# Patient Record
Sex: MALE | Race: ASIAN | NOT HISPANIC OR LATINO
[De-identification: names, ages, dates, MRNs, and addresses within clinical notes are randomized per-mention and may not be internally consistent; named-entity substitution may affect disease eponyms.]

---

## 2017-02-24 ENCOUNTER — APPOINTMENT (OUTPATIENT)
Dept: HEART AND VASCULAR | Facility: CLINIC | Age: 62
End: 2017-02-24

## 2017-05-01 ENCOUNTER — APPOINTMENT (OUTPATIENT)
Dept: HEART AND VASCULAR | Facility: CLINIC | Age: 62
End: 2017-05-01

## 2017-05-01 DIAGNOSIS — K70.30 ALCOHOLIC CIRRHOSIS OF LIVER W/OUT ASCITES: ICD-10-CM

## 2017-05-01 DIAGNOSIS — I72.8 ANEURYSM OF OTHER SPECIFIED ARTERIES: ICD-10-CM

## 2017-05-01 DIAGNOSIS — Z86.79 PERSONAL HISTORY OF OTHER DISEASES OF THE CIRCULATORY SYSTEM: ICD-10-CM

## 2017-05-08 PROBLEM — I72.8 SPLENIC ARTERY ANEURYSM: Status: ACTIVE | Noted: 2017-05-08

## 2017-05-08 PROBLEM — K70.30 CIRRHOSIS, ALCOHOLIC: Status: ACTIVE | Noted: 2017-05-08

## 2017-05-08 PROBLEM — Z86.79 HISTORY OF PORTAL HYPERTENSION: Status: RESOLVED | Noted: 2017-05-08 | Resolved: 2017-05-08

## 2017-05-15 VITALS
HEART RATE: 78 BPM | OXYGEN SATURATION: 96 % | RESPIRATION RATE: 16 BRPM | TEMPERATURE: 98 F | SYSTOLIC BLOOD PRESSURE: 129 MMHG | DIASTOLIC BLOOD PRESSURE: 78 MMHG

## 2017-05-15 RX ORDER — INSULIN LISPRO 100/ML
5 VIAL (ML) SUBCUTANEOUS
Qty: 0 | Refills: 0 | COMMUNITY

## 2017-05-15 RX ORDER — CHLORHEXIDINE GLUCONATE 213 G/1000ML
1 SOLUTION TOPICAL ONCE
Qty: 0 | Refills: 0 | Status: DISCONTINUED | OUTPATIENT
Start: 2017-05-16 | End: 2017-05-17

## 2017-05-15 NOTE — H&P ADULT - HISTORY OF PRESENT ILLNESS
History obtained from Dr. Devi’s office note and patient (poor historian)  62 yo male with a PMhx of DMI, alcoholic cirrhosis, portal hypertension s/p TIPS procedure @ 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 Pt was on the liver transplant list @ [facility] where he was found to have a small hepatoma requiring microwave ablation @ [facility] at which time the large distal splenic aneurysm was again noted which is now filling from the gastric collaterals, based on angiographic report. Pt presented to Dr. Devi for evaluation of splenic aneurysm closure as they do not want to go ahead with the transplant at present.  Pt reports he feels well and denies abdominal pain, leg edema, jaundice, N/V, weight gain, bruising/bleeding. Pt now presents for embolization of splenic aneurysm. History obtained from Dr. Devi’s office note and patient (poor historian)  62 yo male with a PMhx of DMI, alcoholic cirrhosis, portal hypertension s/p TIPS procedure @ [facility] 2013 w/ known thrombocytopenia (baseline platelets 40) at which time he was noted to have a massively dilated splenic artery with at least 2 aneurysmal areas, requiring closure of the proximal splenic artery with coils and glue. Pt was on the liver transplant list @ [facility] where he was found to have a small hepatoma requiring microwave ablation @ [facility] at which time the large distal splenic aneurysm was again noted which is now filling from the gastric collaterals, based on angiographic report. Pt presented to Dr. Devi for evaluation of splenic aneurysm closure as they do not want to go ahead with the transplant at present.  Pt reports he feels well and denies abdominal pain, leg edema, jaundice, N/V, weight gain, bruising/bleeding. Pt now presents for embolization of splenic aneurysm.

## 2017-05-15 NOTE — H&P ADULT - ASSESSMENT
60 yo male with a PMhx of DMI, alcoholic cirrhosis, portal hypertension s/p TIPS procedure @ [facility] 2013 w/ known thrombocytopenia (baseline platelets 40) presents for splenic aneurysm embolization.     *Of note, patient to be transfused 2 units platelets due to labs revealing platelets 38 today 5/16/17.  Transfusion consent signed and in the chart.

## 2017-05-16 ENCOUNTER — INPATIENT (INPATIENT)
Facility: HOSPITAL | Age: 62
LOS: 0 days | Discharge: HOME | DRG: 271 | End: 2017-05-17
Attending: RADIOLOGY | Admitting: RADIOLOGY
Payer: COMMERCIAL

## 2017-05-16 DIAGNOSIS — Z95.828 PRESENCE OF OTHER VASCULAR IMPLANTS AND GRAFTS: Chronic | ICD-10-CM

## 2017-05-16 LAB
ALBUMIN SERPL ELPH-MCNC: 3.2 G/DL — LOW (ref 3.4–5)
ALP SERPL-CCNC: 74 U/L — SIGNIFICANT CHANGE UP (ref 40–120)
ALT FLD-CCNC: 20 U/L — SIGNIFICANT CHANGE UP (ref 12–42)
ANION GAP SERPL CALC-SCNC: 4 MMOL/L — LOW (ref 9–16)
APTT BLD: 35.2 SEC — SIGNIFICANT CHANGE UP (ref 27.5–37.4)
AST SERPL-CCNC: 18 U/L — SIGNIFICANT CHANGE UP (ref 15–37)
BASOPHILS NFR BLD AUTO: 0.6 % — SIGNIFICANT CHANGE UP (ref 0–2)
BILIRUB SERPL-MCNC: 4.5 MG/DL — HIGH (ref 0.2–1.2)
BUN SERPL-MCNC: 14 MG/DL — SIGNIFICANT CHANGE UP (ref 7–23)
CALCIUM SERPL-MCNC: 7.8 MG/DL — LOW (ref 8.5–10.5)
CHLORIDE SERPL-SCNC: 107 MMOL/L — SIGNIFICANT CHANGE UP (ref 96–108)
CO2 SERPL-SCNC: 28 MMOL/L — SIGNIFICANT CHANGE UP (ref 22–31)
CREAT SERPL-MCNC: 0.66 MG/DL — SIGNIFICANT CHANGE UP (ref 0.5–1.3)
EOSINOPHIL NFR BLD AUTO: 5.6 % — SIGNIFICANT CHANGE UP (ref 0–6)
GLUCOSE SERPL-MCNC: 183 MG/DL — HIGH (ref 70–99)
HCT VFR BLD CALC: 38 % — LOW (ref 39–50)
HGB BLD-MCNC: 14.2 G/DL — SIGNIFICANT CHANGE UP (ref 13–17)
INR BLD: 1.33 — HIGH (ref 0.88–1.16)
LYMPHOCYTES # BLD AUTO: 25.4 % — SIGNIFICANT CHANGE UP (ref 13–44)
MCHC RBC-ENTMCNC: 36.1 PG — HIGH (ref 27–34)
MCHC RBC-ENTMCNC: 37.4 G/DL — HIGH (ref 32–36)
MCV RBC AUTO: 96.7 FL — SIGNIFICANT CHANGE UP (ref 80–100)
MONOCYTES NFR BLD AUTO: 6.2 % — SIGNIFICANT CHANGE UP (ref 2–14)
NEUTROPHILS NFR BLD AUTO: 62.2 % — SIGNIFICANT CHANGE UP (ref 43–77)
PLATELET # BLD AUTO: 38 K/UL — LOW (ref 150–400)
POTASSIUM SERPL-MCNC: 3.8 MMOL/L — SIGNIFICANT CHANGE UP (ref 3.5–5.3)
POTASSIUM SERPL-SCNC: 3.8 MMOL/L — SIGNIFICANT CHANGE UP (ref 3.5–5.3)
PROT SERPL-MCNC: 6.7 G/DL — SIGNIFICANT CHANGE UP (ref 6.4–8.2)
PROTHROM AB SERPL-ACNC: 14.8 SEC — HIGH (ref 9.8–12.7)
RBC # BLD: 3.93 M/UL — LOW (ref 4.2–5.8)
RBC # FLD: 13.8 % — SIGNIFICANT CHANGE UP (ref 10.3–16.9)
SODIUM SERPL-SCNC: 139 MMOL/L — SIGNIFICANT CHANGE UP (ref 135–145)
WBC # BLD: 3.2 K/UL — LOW (ref 3.8–10.5)
WBC # FLD AUTO: 3.2 K/UL — LOW (ref 3.8–10.5)

## 2017-05-16 PROCEDURE — 37243 VASC EMBOLIZE/OCCLUDE ORGAN: CPT

## 2017-05-16 PROCEDURE — 75774 ARTERY X-RAY EACH VESSEL: CPT | Mod: 26,XS,59

## 2017-05-16 PROCEDURE — 75726 ARTERY X-RAYS ABDOMEN: CPT | Mod: 26,XS,59

## 2017-05-16 PROCEDURE — 93010 ELECTROCARDIOGRAM REPORT: CPT

## 2017-05-16 PROCEDURE — 36246 INS CATH ABD/L-EXT ART 2ND: CPT | Mod: LT

## 2017-05-16 RX ORDER — INSULIN GLARGINE 100 [IU]/ML
15 INJECTION, SOLUTION SUBCUTANEOUS AT BEDTIME
Qty: 0 | Refills: 0 | Status: DISCONTINUED | OUTPATIENT
Start: 2017-05-16 | End: 2017-05-17

## 2017-05-16 RX ORDER — INSULIN GLARGINE 100 [IU]/ML
15 INJECTION, SOLUTION SUBCUTANEOUS EVERY MORNING
Qty: 0 | Refills: 0 | Status: DISCONTINUED | OUTPATIENT
Start: 2017-05-17 | End: 2017-05-17

## 2017-05-16 RX ORDER — INSULIN GLARGINE 100 [IU]/ML
30 INJECTION, SOLUTION SUBCUTANEOUS
Qty: 0 | Refills: 0 | COMMUNITY

## 2017-05-16 RX ORDER — INSULIN ASPART 100 [IU]/ML
5 INJECTION, SOLUTION SUBCUTANEOUS
Qty: 0 | Refills: 0 | COMMUNITY

## 2017-05-16 RX ORDER — ONDANSETRON 8 MG/1
4 TABLET, FILM COATED ORAL EVERY 4 HOURS
Qty: 0 | Refills: 0 | Status: DISCONTINUED | OUTPATIENT
Start: 2017-05-16 | End: 2017-05-17

## 2017-05-16 RX ORDER — DEXTROSE 50 % IN WATER 50 %
25 SYRINGE (ML) INTRAVENOUS ONCE
Qty: 0 | Refills: 0 | Status: DISCONTINUED | OUTPATIENT
Start: 2017-05-16 | End: 2017-05-17

## 2017-05-16 RX ORDER — DEXTROSE 50 % IN WATER 50 %
1 SYRINGE (ML) INTRAVENOUS ONCE
Qty: 0 | Refills: 0 | Status: DISCONTINUED | OUTPATIENT
Start: 2017-05-16 | End: 2017-05-17

## 2017-05-16 RX ORDER — NADOLOL 80 MG/1
0 TABLET ORAL
Qty: 0 | Refills: 0 | COMMUNITY

## 2017-05-16 RX ORDER — DEXTROSE 50 % IN WATER 50 %
12.5 SYRINGE (ML) INTRAVENOUS ONCE
Qty: 0 | Refills: 0 | Status: DISCONTINUED | OUTPATIENT
Start: 2017-05-16 | End: 2017-05-17

## 2017-05-16 RX ORDER — DOCUSATE SODIUM 100 MG
0 CAPSULE ORAL
Qty: 0 | Refills: 0 | COMMUNITY

## 2017-05-16 RX ORDER — GLUCAGON INJECTION, SOLUTION 0.5 MG/.1ML
1 INJECTION, SOLUTION SUBCUTANEOUS ONCE
Qty: 0 | Refills: 0 | Status: DISCONTINUED | OUTPATIENT
Start: 2017-05-16 | End: 2017-05-17

## 2017-05-16 RX ORDER — SODIUM CHLORIDE 9 MG/ML
1000 INJECTION, SOLUTION INTRAVENOUS
Qty: 0 | Refills: 0 | Status: DISCONTINUED | OUTPATIENT
Start: 2017-05-16 | End: 2017-05-17

## 2017-05-16 RX ORDER — INSULIN LISPRO 100/ML
VIAL (ML) SUBCUTANEOUS
Qty: 0 | Refills: 0 | Status: DISCONTINUED | OUTPATIENT
Start: 2017-05-16 | End: 2017-05-17

## 2017-05-16 RX ORDER — MORPHINE SULFATE 50 MG/1
4 CAPSULE, EXTENDED RELEASE ORAL EVERY 8 HOURS
Qty: 0 | Refills: 0 | Status: DISCONTINUED | OUTPATIENT
Start: 2017-05-16 | End: 2017-05-17

## 2017-05-16 RX ORDER — CEFAZOLIN SODIUM 1 G
1000 VIAL (EA) INJECTION EVERY 8 HOURS
Qty: 0 | Refills: 0 | Status: DISCONTINUED | OUTPATIENT
Start: 2017-05-16 | End: 2017-05-17

## 2017-05-16 RX ORDER — INSULIN LISPRO 100/ML
0 VIAL (ML) SUBCUTANEOUS
Qty: 0 | Refills: 0 | COMMUNITY

## 2017-05-16 RX ORDER — SODIUM CHLORIDE 9 MG/ML
1000 INJECTION INTRAMUSCULAR; INTRAVENOUS; SUBCUTANEOUS
Qty: 0 | Refills: 0 | Status: DISCONTINUED | OUTPATIENT
Start: 2017-05-16 | End: 2017-05-17

## 2017-05-16 RX ORDER — INSULIN LISPRO 100/ML
15 VIAL (ML) SUBCUTANEOUS
Qty: 0 | Refills: 0 | COMMUNITY

## 2017-05-16 RX ADMIN — INSULIN GLARGINE 15 UNIT(S): 100 INJECTION, SOLUTION SUBCUTANEOUS at 22:35

## 2017-05-16 RX ADMIN — Medication 2: at 22:33

## 2017-05-16 RX ADMIN — Medication 100 MILLIGRAM(S): at 22:33

## 2017-05-16 NOTE — BRIEF OPERATIVE NOTE - COMMENTS
5F R CFA sheath out and hemostasis obtained using manual compression. Bedrest 6h post sheath pull. Ancef 1g IV q8h x 3 doses.

## 2017-05-16 NOTE — BRIEF OPERATIVE NOTE - OPERATION/FINDINGS
visceral angiogram demonstrates splenic artery aneurysm supplied by branches of right and left gastric artery, branches of left hepatic artery, embolization performed using 1cc of nBCA adhesive, visceral angiogram demonstrates splenic artery aneurysm supplied by branches of right and left gastric artery, GDA, branches of left hepatic artery, embolization performed on gastroepiploic artery using 1cc of nBCA adhesive,

## 2017-05-17 ENCOUNTER — TRANSCRIPTION ENCOUNTER (OUTPATIENT)
Age: 62
End: 2017-05-17

## 2017-05-17 VITALS — TEMPERATURE: 99 F

## 2017-05-17 LAB
ALBUMIN SERPL ELPH-MCNC: 2.9 G/DL — LOW (ref 3.4–5)
ALP SERPL-CCNC: 69 U/L — SIGNIFICANT CHANGE UP (ref 40–120)
ALT FLD-CCNC: 20 U/L — SIGNIFICANT CHANGE UP (ref 12–42)
ANION GAP SERPL CALC-SCNC: 8 MMOL/L — LOW (ref 9–16)
APPEARANCE UR: CLEAR — SIGNIFICANT CHANGE UP
AST SERPL-CCNC: 28 U/L — SIGNIFICANT CHANGE UP (ref 15–37)
BILIRUB SERPL-MCNC: 4.2 MG/DL — HIGH (ref 0.2–1.2)
BILIRUB UR-MCNC: NEGATIVE — SIGNIFICANT CHANGE UP
BUN SERPL-MCNC: 18 MG/DL — SIGNIFICANT CHANGE UP (ref 7–23)
CALCIUM SERPL-MCNC: 8.2 MG/DL — LOW (ref 8.5–10.5)
CHLORIDE SERPL-SCNC: 103 MMOL/L — SIGNIFICANT CHANGE UP (ref 96–108)
CO2 SERPL-SCNC: 25 MMOL/L — SIGNIFICANT CHANGE UP (ref 22–31)
COLOR SPEC: YELLOW — SIGNIFICANT CHANGE UP
CREAT SERPL-MCNC: 0.72 MG/DL — SIGNIFICANT CHANGE UP (ref 0.5–1.3)
DIFF PNL FLD: (no result)
GLUCOSE SERPL-MCNC: 290 MG/DL — HIGH (ref 70–99)
GLUCOSE UR QL: NEGATIVE — SIGNIFICANT CHANGE UP
HBA1C BLD-MCNC: 6.7 % — HIGH (ref 4.8–5.6)
HCT VFR BLD CALC: 36.8 % — LOW (ref 39–50)
HGB BLD-MCNC: 13.5 G/DL — SIGNIFICANT CHANGE UP (ref 13–17)
KETONES UR-MCNC: NEGATIVE — SIGNIFICANT CHANGE UP
LEUKOCYTE ESTERASE UR-ACNC: NEGATIVE — SIGNIFICANT CHANGE UP
MCHC RBC-ENTMCNC: 35.3 PG — HIGH (ref 27–34)
MCHC RBC-ENTMCNC: 36.7 G/DL — HIGH (ref 32–36)
MCV RBC AUTO: 96.3 FL — SIGNIFICANT CHANGE UP (ref 80–100)
NITRITE UR-MCNC: NEGATIVE — SIGNIFICANT CHANGE UP
PH UR: 7.5 — SIGNIFICANT CHANGE UP (ref 5–8)
PLATELET # BLD AUTO: 62 K/UL — LOW (ref 150–400)
POTASSIUM SERPL-MCNC: 4.7 MMOL/L — SIGNIFICANT CHANGE UP (ref 3.5–5.3)
POTASSIUM SERPL-SCNC: 4.7 MMOL/L — SIGNIFICANT CHANGE UP (ref 3.5–5.3)
PROT SERPL-MCNC: 6.6 G/DL — SIGNIFICANT CHANGE UP (ref 6.4–8.2)
PROT UR-MCNC: 30 MG/DL
RBC # BLD: 3.82 M/UL — LOW (ref 4.2–5.8)
RBC # FLD: 13.4 % — SIGNIFICANT CHANGE UP (ref 10.3–16.9)
SODIUM SERPL-SCNC: 136 MMOL/L — SIGNIFICANT CHANGE UP (ref 135–145)
SP GR SPEC: <=1.005 — SIGNIFICANT CHANGE UP (ref 1–1.03)
UROBILINOGEN FLD QL: 1 E.U./DL — SIGNIFICANT CHANGE UP
WBC # BLD: 4.9 K/UL — SIGNIFICANT CHANGE UP (ref 3.8–10.5)
WBC # FLD AUTO: 4.9 K/UL — SIGNIFICANT CHANGE UP (ref 3.8–10.5)

## 2017-05-17 PROCEDURE — 86900 BLOOD TYPING SEROLOGIC ABO: CPT

## 2017-05-17 PROCEDURE — 36430 TRANSFUSION BLD/BLD COMPNT: CPT

## 2017-05-17 PROCEDURE — C1887: CPT

## 2017-05-17 PROCEDURE — 36415 COLL VENOUS BLD VENIPUNCTURE: CPT

## 2017-05-17 PROCEDURE — 87086 URINE CULTURE/COLONY COUNT: CPT

## 2017-05-17 PROCEDURE — 86850 RBC ANTIBODY SCREEN: CPT

## 2017-05-17 PROCEDURE — 83036 HEMOGLOBIN GLYCOSYLATED A1C: CPT

## 2017-05-17 PROCEDURE — 81001 URINALYSIS AUTO W/SCOPE: CPT

## 2017-05-17 PROCEDURE — 85610 PROTHROMBIN TIME: CPT

## 2017-05-17 PROCEDURE — 93005 ELECTROCARDIOGRAM TRACING: CPT

## 2017-05-17 PROCEDURE — 80053 COMPREHEN METABOLIC PANEL: CPT

## 2017-05-17 PROCEDURE — 86901 BLOOD TYPING SEROLOGIC RH(D): CPT

## 2017-05-17 PROCEDURE — C1889: CPT

## 2017-05-17 PROCEDURE — C1769: CPT

## 2017-05-17 PROCEDURE — P9035: CPT

## 2017-05-17 PROCEDURE — 85730 THROMBOPLASTIN TIME PARTIAL: CPT

## 2017-05-17 PROCEDURE — C1894: CPT

## 2017-05-17 PROCEDURE — 85027 COMPLETE CBC AUTOMATED: CPT

## 2017-05-17 PROCEDURE — 85025 COMPLETE CBC W/AUTO DIFF WBC: CPT

## 2017-05-17 RX ORDER — CEPHALEXIN 500 MG
1 CAPSULE ORAL
Qty: 28 | Refills: 0 | OUTPATIENT
Start: 2017-05-17 | End: 2017-05-24

## 2017-05-17 RX ORDER — INSULIN GLARGINE 100 [IU]/ML
15 INJECTION, SOLUTION SUBCUTANEOUS ONCE
Qty: 0 | Refills: 0 | Status: COMPLETED | OUTPATIENT
Start: 2017-05-17 | End: 2017-05-17

## 2017-05-17 RX ADMIN — Medication 4: at 06:01

## 2017-05-17 RX ADMIN — Medication 100 MILLIGRAM(S): at 06:00

## 2017-05-17 RX ADMIN — Medication 3: at 11:48

## 2017-05-17 RX ADMIN — INSULIN GLARGINE 15 UNIT(S): 100 INJECTION, SOLUTION SUBCUTANEOUS at 11:01

## 2017-05-17 RX ADMIN — INSULIN GLARGINE 15 UNIT(S): 100 INJECTION, SOLUTION SUBCUTANEOUS at 09:00

## 2017-05-17 NOTE — DISCHARGE NOTE ADULT - HOSPITAL COURSE
62 y/o M w/ PMHX DM, Alcoholic Cirrhosis, Portal Hypertension s/p TIPS procedure, known Thrombocytopenia (baseline platelet count 40s), w/ Splenic Artery Aneurysm, referred for embolization of splenic artery aneurysm.      Pt s/p visceral angiogram on 5/16/17 showing splenic artery aneurysm supplied by branches of right and left gastric artery, GDA, branches of left hepatic artery, embolization performed on gastroepiploic artery.   Pt s/p splenic artery aneurysm embolization as above.     This AM VSS, Pt Asymptomatic, R Groin Stable, R DP 2+, R PT 2+, Labs reviewed    Pt reported hematuria overnight which is resolved this AM. Hematuria is possibly 2nd to Rifaximin medication which patient takes to prevent hepatic encephalopathy. H/H stable 13.5/36. UA showed moderate blood overnight and urine clear this AM. Outpatient follow-up with PMD as discussed with Dr. Devi.     H/o Thrombocytopenia with baseline platelet count 40s per H and P. Platelet count 38 yesterday and 62 this AM. Outpatient follow-up with PMD as discussed with Dr. Devi.    FS was 330 this AM and now 290s after patient received total of Lantus 30 units this AM (home dose as confirmed with patient and pharmacy). LAURA was ordered as well. Outpatient follow-up with PMD as discussed with Dr. Devi.     Pt stable for D/C home as per Dr. Devi. See Primary Doctor Dr. Talia Brito tomorrow.  Dr. Devi’s office will contact patient in reference to follow-up. See Dr. Lokesh Ramírez (Hepatologist/GI MD)  in 1 week. 62 y/o M w/ PMHX DM, Alcoholic Cirrhosis, Portal Hypertension s/p TIPS procedure, known Thrombocytopenia (baseline platelet count 40s), w/ Splenic Artery Aneurysm, referred for embolization of splenic artery aneurysm.      Pt s/p visceral angiogram on 5/16/17 showing splenic artery aneurysm supplied by branches of right and left gastric artery, GDA, branches of left hepatic artery, embolization performed on gastroepiploic artery.   Pt s/p splenic artery aneurysm embolization as above.     This AM VSS, Pt Asymptomatic, R Groin Stable, R DP 2+, R PT 2+, Labs reviewed    Pt reported hematuria overnight which is resolved this AM. Hematuria is possibly 2nd to Rifaximin medication which patient takes to prevent hepatic encephalopathy. H/H stable 13.5/36. UA showed moderate blood overnight and urine clear this AM. Outpatient follow-up with PMD as discussed with Dr. Devi.     H/o Thrombocytopenia with baseline platelet count 40s per H and P. Platelet count 38 yesterday and 62 this AM. Outpatient follow-up with PMD as discussed with Dr. Devi.    FS was 330 this AM and now 290s after patient received total of Lantus 30 units this AM (home dose as confirmed with patient and pharmacy). LAURA was ordered as well. Outpatient follow-up with PMD as discussed with Dr. Devi.     Pt stable for D/C home as per Dr. Devi. See Primary Doctor Dr. Talia Brito tomorrow.  Dr. Devi’s office will contact patient in reference to follow-up. See Dr. Lokesh Ramírez (Hepatologist/GI MD)  in 1 week.      Pt seen and examined bedside.  Pt reports one episode of hematuria overnight that has self-resolved.  Pt denies fever/chills, dysuria, abdominal pain, diarrhea, and cough  Pt denies melena, BRBPR, hematemesis.   	  V/S 	BP: 110s/60s 	HR: 80s    RR: 16    T: 98	O2: 98% RA   	  GEN: NAD  PULM:  CTA B/L  CARD: No JVD B/L, RRR, S1 and S2   ABD: +BS, NT, soft/ND	  EXT: No Edema B/L LE, R DP 2+, R PT 2+  R GROIN: soft, no hematoma, no bleeding, no femoral bruit  NEURO: A+Ox3, no focal deficit                          13.5   4.9   )-----------( 62       ( 17 May 2017 07:43 )             36.8     05-17    136  |  103  |  18  ----------------------------<  290<H>  4.7   |  25  |  0.72    Ca    8.2<L>      17 May 2017 07:43    TPro  6.6  /  Alb  2.9<L>  /  TBili  4.2<H>  /  DBili  x   /  AST  28  /  ALT  20  /  AlkPhos  69  05-17 62 y/o M w/ PMHX DM, Alcoholic Cirrhosis, Portal Hypertension s/p TIPS procedure, known Thrombocytopenia (baseline platelet count 40s), w/ Splenic Artery Aneurysm, referred for embolization of splenic artery aneurysm.      Pt s/p visceral angiogram on 5/16/17 showing splenic artery aneurysm supplied by branches of right and left gastric artery, GDA, branches of left hepatic artery, embolization performed on gastroepiploic artery.   Pt s/p splenic artery aneurysm embolization as above.     This AM VSS, Pt Asymptomatic, R Groin Stable, R DP 2+, R PT 2+, Labs reviewed    Pt reported hematuria overnight which is resolved this AM. Hematuria is possibly 2nd to Rifaximin medication which patient takes to prevent hepatic encephalopathy. H/H stable 13.5/36. UA showed moderate blood overnight and urine clear this AM. Outpatient follow-up with PMD as discussed with Dr. Devi.     H/o Thrombocytopenia with baseline platelet count 40s per H and P. Platelet count 38 yesterday and 62 this AM. Outpatient follow-up with PMD as discussed with Dr. Devi.    FS was 330 this AM and now 290s after patient received total of Lantus 30 units this AM (home dose as confirmed with patient and pharmacy). LAURA was ordered as well. Outpatient follow-up with PMD as discussed with Dr. Devi.     Pt stable for D/C home as per Dr. Devi. See Primary Doctor Dr. Talia Brito tomorrow.  Dr. Devi’s office will contact patient in reference to follow-up. See Dr. Lokesh Ramírez (Hepatologist/GI MD)  in 1 week.      Pt seen and examined bedside.  Pt reports one episode of hematuria overnight that has self-resolved.  Pt denies fever/chills, dysuria, abdominal pain, diarrhea, and cough  Pt denies melena, BRBPR, hematemesis.   	  V/S 	BP: 130s/60s 	HR: 60s-70ss    RR: 16    T: 98	O2: 95% RA   	  GEN: NAD  PULM:  CTA B/L  CARD: No JVD B/L, RRR, S1 and S2   ABD: +BS, NT, soft/ND	  EXT: No Edema B/L LE, R DP 2+, R PT 2+  R GROIN: soft, no hematoma, no bleeding, no femoral bruit  NEURO: A+Ox3, no focal deficit                          13.5   4.9   )-----------( 62       ( 17 May 2017 07:43 )             36.8     05-17    136  |  103  |  18  ----------------------------<  290<H>  4.7   |  25  |  0.72    Ca    8.2<L>      17 May 2017 07:43    TPro  6.6  /  Alb  2.9<L>  /  TBili  4.2<H>  /  DBili  x   /  AST  28  /  ALT  20  /  AlkPhos  69  05-17

## 2017-05-17 NOTE — DISCHARGE NOTE ADULT - CARE PLAN
Principal Discharge DX:	Splenic artery aneurysm  Goal:	MD follow-up  Instructions for follow-up, activity and diet:	Prescribed Keflex as antibiotic for infection prevention. See Primary Doctor Dr. Talia Brito tomorrow.  Dr. Devi’s office will contact you in reference to follow-up. See Dr. Lokesh Ramírez in 1 week.     Monitor right groin/leg for swelling, bleeding, pain or skin discoloration if any of these findings are noted go to nearest ER and seek medical attention and call [phone] if any questions  Secondary Diagnosis:	Thrombocytopenia  Instructions for follow-up, activity and diet:	MD follow-up recommended for low platelet count. See Primary Doctor Dr. Talia Brito tomorrow.  See Dr. Lokesh Ramírez in 1 week. If blood noted in urine, stool, cough, notify MD and seek medical attention  Secondary Diagnosis:	DM (diabetes mellitus)  Instructions for follow-up, activity and diet:	Continue Basaglar (Lantus) home insulin regimen and Humalog home insulin regimen (sliding scale). Check fingerstick three times daily before meals and at bedtime. If fingerstick greater than 300 call MD for further instructions. Avoid concentrated sweets. Follow diabetic diet. . See Primary Doctor Dr. Talia Brito tomorrow  Secondary Diagnosis:	Hematuria  Instructions for follow-up, activity and diet:	The blood noted in your urine overnight can be the result of the Xifaxan (Rifaximin) medication you are taking. See Primary Doctor Dr. Talia Brito tomorrow and See Dr. Lokesh Ramírez in 1 week for further evaluation. Your Primary Doctor can call [phone] tomorrow to follow-up the results of your urine culture sent at [facility] overnight  Secondary Diagnosis:	Portal hypertension  Instructions for follow-up, activity and diet:	See Primary Doctor Dr. Talia Brito tomorrow and See Dr. Lokesh Ramírez in 1 week for further evaluation. Continue current medical regimen until MD follow-up Principal Discharge DX:	Splenic artery aneurysm  Goal:	MD follow-up  Instructions for follow-up, activity and diet:	Prescribed Keflex as antibiotic for infection prevention. See Primary Doctor Dr. Talia Brito tomorrow.  Dr. Devi’s office will contact you in reference to follow-up. See Dr. Lokesh Ramírez in 1 week.     Monitor right groin/leg for swelling, bleeding, pain or skin discoloration if any of these findings are noted go to nearest ER and seek medical attention and call [phone] if any questions  Secondary Diagnosis:	Thrombocytopenia  Instructions for follow-up, activity and diet:	MD follow-up recommended for low platelet count. See Primary Doctor Dr. Talia Brito tomorrow.  See Dr. Lokesh Ramírez in 1 week. If blood noted in urine, stool, cough, notify MD and seek medical attention  Secondary Diagnosis:	DM (diabetes mellitus)  Instructions for follow-up, activity and diet:	Continue Basaglar (Lantus) home insulin regimen and Humalog home insulin regimen (sliding scale). Check fingerstick three times daily before meals and at bedtime. If fingerstick greater than 300 call MD for further instructions. Avoid concentrated sweets. Follow diabetic diet. . See Primary Doctor Dr. Talia Brito tomorrow  Secondary Diagnosis:	Hematuria  Instructions for follow-up, activity and diet:	The blood noted in your urine overnight can be the result of the Xifaxan (Rifaximin) medication you are taking. See Primary Doctor Dr. Talia Brito tomorrow and See Dr. Lokesh Ramírez in 1 week for further evaluation. Your Primary Doctor can call [phone] tomorrow to follow-up the results of your urine culture sent at [facility] overnight  Secondary Diagnosis:	Portal hypertension  Instructions for follow-up, activity and diet:	See Primary Doctor Dr. Talia Brito tomorrow and See Dr. Lokesh Ramírez in 1 week for further evaluation. Continue current medical regimen until MD follow-up Principal Discharge DX:	Splenic artery aneurysm  Goal:	MD follow-up  Instructions for follow-up, activity and diet:	Prescribed Keflex as antibiotic for infection prevention. See Primary Doctor Dr. Talia Brito tomorrow.  Dr. Devi’s office will contact you in reference to follow-up. See Dr. Lokesh Ramírez in 1 week.     Monitor right groin/leg for swelling, bleeding, pain or skin discoloration if any of these findings are noted go to nearest ER and seek medical attention and call [phone] if any questions  Secondary Diagnosis:	Thrombocytopenia  Instructions for follow-up, activity and diet:	MD follow-up recommended for low platelet count. See Primary Doctor Dr. Talia Brito tomorrow.  See Dr. Lokesh Ramírez in 1 week. If blood noted in urine, stool, cough, notify MD and seek medical attention  Secondary Diagnosis:	DM (diabetes mellitus)  Instructions for follow-up, activity and diet:	Continue Basaglar (Lantus) home insulin regimen and Humalog home insulin regimen (sliding scale). Check fingerstick three times daily before meals and at bedtime. If fingerstick greater than 300 call MD for further instructions. Avoid concentrated sweets. Follow diabetic diet. . See Primary Doctor Dr. Tlaia Brito tomorrow  Secondary Diagnosis:	Hematuria  Instructions for follow-up, activity and diet:	The blood noted in your urine overnight can be the result of the Xifaxan (Rifaximin) medication you are taking. See Primary Doctor Dr. Talia Brito tomorrow and See Dr. Lokesh Ramírez in 1 week for further evaluation. Your Primary Doctor can call [phone] tomorrow to follow-up the results of your urine culture sent at [facility] overnight  Secondary Diagnosis:	Portal hypertension  Instructions for follow-up, activity and diet:	See Primary Doctor Dr. Talia Brito tomorrow and See Dr. Lokesh Ramírez in 1 week for further evaluation. Continue current medical regimen until MD follow-up Principal Discharge DX:	Splenic artery aneurysm  Goal:	MD follow-up  Instructions for follow-up, activity and diet:	Prescribed Keflex as antibiotic for infection prevention. See Primary Doctor Dr. Talia Brito tomorrow.  Dr. Devi’s office will contact you in reference to follow-up. See Dr. Lokesh Ramírez in 1 week.     Monitor right groin/leg for swelling, bleeding, pain or skin discoloration if any of these findings are noted go to nearest ER and seek medical attention and call [phone] if any questions  Secondary Diagnosis:	Thrombocytopenia  Instructions for follow-up, activity and diet:	MD follow-up recommended for low platelet count. See Primary Doctor Dr. Talia Brito tomorrow.  See Dr. Lokesh Ramírez in 1 week. If blood noted in urine, stool, cough, notify MD and seek medical attention  Secondary Diagnosis:	DM (diabetes mellitus)  Instructions for follow-up, activity and diet:	Continue Basaglar (Lantus) home insulin regimen and Humalog home insulin regimen (sliding scale). Check fingerstick three times daily before meals and at bedtime. If fingerstick greater than 300 call MD for further instructions. Avoid concentrated sweets. Follow diabetic diet. . See Primary Doctor Dr. Talia Brito tomorrow  Secondary Diagnosis:	Hematuria  Instructions for follow-up, activity and diet:	The blood noted in your urine overnight can be the result of the Xifaxan (Rifaximin) medication you are taking. See Primary Doctor Dr. Talia Brito tomorrow and See Dr. Lokesh Ramírez in 1 week for further evaluation. Your Primary Doctor can call [phone] tomorrow to follow-up the results of your urine culture sent at [facility] overnight  Secondary Diagnosis:	Portal hypertension  Instructions for follow-up, activity and diet:	See Primary Doctor Dr. Talia Brito tomorrow and See Dr. Lokesh Ramírez in 1 week for further evaluation. Continue current medical regimen until MD follow-up

## 2017-05-17 NOTE — DISCHARGE NOTE ADULT - CARE PROVIDERS DIRECT ADDRESSES
[email or web address],DirectAddress_Unknown

## 2017-05-17 NOTE — DISCHARGE NOTE ADULT - PLAN OF CARE
MD follow-up Prescribed Keflex as antibiotic for infection prevention. See Primary Doctor Dr. Talia Brito tomorrow.  Dr. Devi’s office will contact you in reference to follow-up. See Dr. Lokesh Ramírez in 1 week.     Monitor right groin/leg for swelling, bleeding, pain or skin discoloration if any of these findings are noted go to nearest ER and seek medical attention and call [phone] if any questions MD follow-up recommended for low platelet count. See Primary Doctor Dr. Talia Brito tomorrow.  See Dr. Lokesh Ramírez in 1 week. If blood noted in urine, stool, cough, notify MD and seek medical attention Continue Basaglar (Lantus) home insulin regimen and Humalog home insulin regimen (sliding scale). Check fingerstick three times daily before meals and at bedtime. If fingerstick greater than 300 call MD for further instructions. Avoid concentrated sweets. Follow diabetic diet. . See Primary Doctor Dr. Talia Brito tomorrow The blood noted in your urine overnight can be the result of the Xifaxan (Rifaximin) medication you are taking. See Primary Doctor Dr. Talia Brito tomorrow and See Dr. Lokesh Ramírez in 1 week for further evaluation. Your Primary Doctor can call [phone] tomorrow to follow-up the results of your urine culture sent at [facility] overnight See Primary Doctor Dr. Talia Brito tomorrow and See Dr. Lokesh Ramírez in 1 week for further evaluation. Continue current medical regimen until MD follow-up

## 2017-05-17 NOTE — DISCHARGE NOTE ADULT - PATIENT PORTAL LINK FT
“You can access the FollowHealth Patient Portal, offered by [facility], by registering with the following website: [email or web address]”

## 2017-05-17 NOTE — DISCHARGE NOTE ADULT - MEDICATION SUMMARY - MEDICATIONS TO TAKE
I will START or STAY ON the medications listed below when I get home from the hospital:    HumaLOG KwikPen 100 units/mL subcutaneous solution  -- unit(s) subcutaneous 3 times a day, As Needed  -- Indication: For Diabetes    Basaglar KwikPen 100 units/mL subcutaneous solution  -- 30 unit(s) subcutaneous 2 times a day  -- Indication: For Diabetes    nadolol 20 mg oral tablet  --  by mouth once a day  -- Indication: For Portal Hypertension    Keflex 500 mg oral capsule  -- 1 cap(s) by mouth 4 times a day  -- Finish all this medication unless otherwise directed by prescriber.    -- Indication: For Infection Prevention    Doc-Q-Lace 100 mg oral capsule  --  by mouth 3 times a day  -- Indication: For As Needed for Constipation    Xifaxan 550 mg oral tablet  -- 1 tab(s) by mouth 2 times a day  -- Indication: For to Prevent Hepatic Encephalopathy

## 2017-05-17 NOTE — DISCHARGE NOTE ADULT - PROVIDER TOKENS
TOKEN:'[id number]:[id number]',FREE:[LAST:[Desiree],FIRST:[Lokesh],PHONE:[phone]:[(   )    -],ADDRESS:[[address] [place] [address]]],FREE:[LAST:[Daryl,],FIRST:[Talia],PHONE:[phone]:[(   )    -],ADDRESS:[[address] [place] [address]]]

## 2017-05-17 NOTE — DISCHARGE NOTE ADULT - CARE PROVIDER_API CALL
Margarito Devi), Diagnostic Radiology  [address] [address]  Phone: [phone]  Fax: [phone]    Lokesh Ramírez  [address] [place] [address]  Phone: [phone]  Fax: (   )    -    Brito Yeahseon  [address] [place] [address]  Phone: [phone]  Fax: (   )    -

## 2017-05-18 LAB
CULTURE RESULTS: NO GROWTH — SIGNIFICANT CHANGE UP
SPECIMEN SOURCE: SIGNIFICANT CHANGE UP

## 2017-05-20 DIAGNOSIS — R31.9 HEMATURIA, UNSPECIFIED: ICD-10-CM

## 2017-05-20 DIAGNOSIS — F10.10 ALCOHOL ABUSE, UNCOMPLICATED: ICD-10-CM

## 2017-05-20 DIAGNOSIS — I72.8 ANEURYSM OF OTHER SPECIFIED ARTERIES: ICD-10-CM

## 2017-05-20 DIAGNOSIS — Z79.4 LONG TERM (CURRENT) USE OF INSULIN: ICD-10-CM

## 2017-05-20 DIAGNOSIS — Z98.890 OTHER SPECIFIED POSTPROCEDURAL STATES: ICD-10-CM

## 2017-05-20 DIAGNOSIS — K70.30 ALCOHOLIC CIRRHOSIS OF LIVER WITHOUT ASCITES: ICD-10-CM

## 2017-05-20 DIAGNOSIS — T36.6X5A ADVERSE EFFECT OF RIFAMPICINS, INITIAL ENCOUNTER: ICD-10-CM

## 2017-05-20 DIAGNOSIS — K76.6 PORTAL HYPERTENSION: ICD-10-CM

## 2017-05-20 DIAGNOSIS — E11.9 TYPE 2 DIABETES MELLITUS WITHOUT COMPLICATIONS: ICD-10-CM

## 2017-05-20 DIAGNOSIS — D69.6 THROMBOCYTOPENIA, UNSPECIFIED: ICD-10-CM

## 2019-06-17 ENCOUNTER — APPOINTMENT (OUTPATIENT)
Dept: UROLOGY | Facility: CLINIC | Age: 64
End: 2019-06-17
Payer: MEDICAID

## 2019-06-17 VITALS
WEIGHT: 180 LBS | TEMPERATURE: 98.3 F | HEIGHT: 67 IN | BODY MASS INDEX: 28.25 KG/M2 | DIASTOLIC BLOOD PRESSURE: 79 MMHG | SYSTOLIC BLOOD PRESSURE: 135 MMHG | HEART RATE: 86 BPM

## 2019-06-17 DIAGNOSIS — C22.8 MALIGNANT NEOPLASM OF LIVER, PRIMARY, UNSPECIFIED AS TO TYPE: ICD-10-CM

## 2019-06-17 DIAGNOSIS — E11.9 TYPE 2 DIABETES MELLITUS W/OUT COMPLICATIONS: ICD-10-CM

## 2019-06-17 DIAGNOSIS — K62.4 STENOSIS OF ANUS AND RECTUM: ICD-10-CM

## 2019-06-17 DIAGNOSIS — N40.1 BENIGN PROSTATIC HYPERPLASIA WITH LOWER URINARY TRACT SYMPMS: ICD-10-CM

## 2019-06-17 DIAGNOSIS — E11.49 TYPE 2 DIABETES MELLITUS WITH OTHER DIABETIC NEUROLOGICAL COMPLICATION: ICD-10-CM

## 2019-06-17 DIAGNOSIS — Z87.891 PERSONAL HISTORY OF NICOTINE DEPENDENCE: ICD-10-CM

## 2019-06-17 DIAGNOSIS — R35.1 NOCTURIA: ICD-10-CM

## 2019-06-17 PROCEDURE — 99204 OFFICE O/P NEW MOD 45 MIN: CPT

## 2019-06-17 RX ORDER — OXYBUTYNIN CHLORIDE 5 MG/1
5 TABLET, EXTENDED RELEASE ORAL
Qty: 30 | Refills: 3 | Status: ACTIVE | OUTPATIENT
Start: 2019-06-17

## 2019-06-17 NOTE — REVIEW OF SYSTEMS
[Wake up at night to urinate  How many times?  ___] : wakes up to urinate [unfilled] times during the night [Strong urge to urinate] : strong urge to urinate [Bladder pressure] : experiences bladder pressure [Negative] : Heme/Lymph

## 2019-06-17 NOTE — LETTER BODY
[FreeTextEntry1] : Talia Brito MD\par  [address] [facility] [address], \[place] [address]\par [phone]\par \par Dear Dr. Brito,\par \par Reason for Visit: BPH.\par \par This is a 63 year old [place] gentleman with history of liver cancer, right orchiectomy, and diabetes, presents with symptoms of BPH. Patient is here today for evaluation. Patient reports of urinary frequency and urgency accompanied with strong urinary flow. He reports of nocturia up to 3x per night. He denies any hematuria or urinary incontinence. He denies any glaucoma. He denies any alleviating factors. He has not tried any medical therapy previously. He reports no pain. All other review of systems are negative. He has no cancer in his family medical history. He had liver transplant. Past medical history, family history and social history were inquired and were noncontributory to current condition. The patient does not use tobacco or drink alcohol. Medications and allergies were reviewed. He has no known allergies to medication. \par \par On examination, the patient is a healthy-appearing gentleman in no acute distress. He is alert and oriented follows commands. He has normal mood and affect. He is normocephalic. Neck is supple. Oral no thrush Respirations are unlabored. His abdomen is soft and nontender. Bladder is nonpalpable. No CVA tenderness. Neurologically he is grossly intact. No peripheral edema. Skin without gross abnormality. He has normal male external genitalia. Normal meatus. Left testes is descended intrascrotally and normal to palpation. On rectal examination, there is normal sphincter tone. The prostate is clinically benign without focal induration or nodularity. His right hemiscrotum due to accident. His left testicle is unremarkable. He has large Chevron incision.\par \par ASSESSMENT: BPH. Overactive bladder.\par \par I counseled the patient on the various etiology of his symptoms. I discussed the natural history of BPH and the treatment options available. I discussed the options of conservative management with fluid in dietary restrictions, herbal therapy, medical therapy, and minimally invasive procedures. Risk and benefits were discussed. I answered his questions. Patient has mainly irritative symptoms with strong urine flow. He denies any history fo glaucoma. I recommended he try Oxybutynin 5 mg. I discussed the potential side effects of the medication. I counseled the patient on its use and side effects. If the patient develops any side effects, the patient will discontinue the medication and contact me. He will obtain BMP and PSA today to further evaluate. Risks and alternatives were discussed. I answered the patient questions. The patient will follow-up as directed and will contact me with any questions or concerns. Thank you for the opportunity to participate in the care of Mr. MEADE. I will keep you updated on his progress.\par \par Plan: Trial of Oxybutynin 5 mg. BMP. PSA. Follow up in 1 month.

## 2019-06-17 NOTE — ADDENDUM
[FreeTextEntry1] : Entered by Elsa Banda, acting as scribe for Dr. Jonny Steiner.\par \par The documentation recorded by the scribe accurately reflects the service I personally performed and the decisions made by me.

## 2019-06-17 NOTE — PHYSICAL EXAM
[General Appearance - Well Developed] : well developed [Normal Appearance] : normal appearance [General Appearance - Well Nourished] : well nourished [Well Groomed] : well groomed [Edema] : no peripheral edema [General Appearance - In No Acute Distress] : no acute distress [Respiration, Rhythm And Depth] : normal respiratory rhythm and effort [Exaggerated Use Of Accessory Muscles For Inspiration] : no accessory muscle use [Abdomen Soft] : soft [Costovertebral Angle Tenderness] : no ~M costovertebral angle tenderness [Abdomen Tenderness] : non-tender [Urethral Meatus] : meatus normal [Scrotum] : the scrotum was normal [Urinary Bladder Findings] : the bladder was normal on palpation [Testes Mass (___cm)] : there were no testicular masses [FreeTextEntry1] : right testis is absent [No Prostate Nodules] : no prostate nodules [Normal Station and Gait] : the gait and station were normal for the patient's age [] : no rash [No Focal Deficits] : no focal deficits [Oriented To Time, Place, And Person] : oriented to person, place, and time [Affect] : the affect was normal [Mood] : the mood was normal [Not Anxious] : not anxious [No Palpable Adenopathy] : no palpable adenopathy

## 2022-11-01 ENCOUNTER — APPOINTMENT (OUTPATIENT)
Dept: UROLOGY | Facility: CLINIC | Age: 67
End: 2022-11-01

## 2022-12-09 ENCOUNTER — APPOINTMENT (OUTPATIENT)
Dept: UROLOGY | Facility: CLINIC | Age: 67
End: 2022-12-09